# Patient Record
Sex: MALE | Race: WHITE | URBAN - METROPOLITAN AREA
[De-identification: names, ages, dates, MRNs, and addresses within clinical notes are randomized per-mention and may not be internally consistent; named-entity substitution may affect disease eponyms.]

---

## 2018-04-01 ENCOUNTER — OUTPATIENT (OUTPATIENT)
Dept: OUTPATIENT SERVICES | Facility: HOSPITAL | Age: 23
LOS: 1 days | End: 2018-04-01

## 2018-04-08 ENCOUNTER — INPATIENT (INPATIENT)
Facility: HOSPITAL | Age: 23
LOS: 4 days | Discharge: HOME | End: 2018-04-13
Attending: PSYCHIATRY & NEUROLOGY | Admitting: PSYCHIATRY & NEUROLOGY
Payer: MEDICAID

## 2018-04-08 VITALS
DIASTOLIC BLOOD PRESSURE: 102 MMHG | HEART RATE: 110 BPM | SYSTOLIC BLOOD PRESSURE: 154 MMHG | RESPIRATION RATE: 16 BRPM | TEMPERATURE: 99 F | OXYGEN SATURATION: 99 %

## 2018-04-08 DIAGNOSIS — R69 ILLNESS, UNSPECIFIED: ICD-10-CM

## 2018-04-08 DIAGNOSIS — F29 UNSPECIFIED PSYCHOSIS NOT DUE TO A SUBSTANCE OR KNOWN PHYSIOLOGICAL CONDITION: ICD-10-CM

## 2018-04-08 LAB
ALBUMIN SERPL ELPH-MCNC: 4.7 G/DL — SIGNIFICANT CHANGE UP (ref 3.3–5)
ALP SERPL-CCNC: 87 U/L — SIGNIFICANT CHANGE UP (ref 40–120)
ALT FLD-CCNC: 17 U/L — SIGNIFICANT CHANGE UP (ref 4–41)
AMPHET UR-MCNC: NEGATIVE — SIGNIFICANT CHANGE UP
APAP SERPL-MCNC: < 15 UG/ML — LOW (ref 15–25)
APPEARANCE UR: CLEAR — SIGNIFICANT CHANGE UP
AST SERPL-CCNC: 16 U/L — SIGNIFICANT CHANGE UP (ref 4–40)
BARBITURATES UR SCN-MCNC: NEGATIVE — SIGNIFICANT CHANGE UP
BASE EXCESS BLDV CALC-SCNC: 4.2 MMOL/L — SIGNIFICANT CHANGE UP
BASOPHILS # BLD AUTO: 0.04 K/UL — SIGNIFICANT CHANGE UP (ref 0–0.2)
BASOPHILS NFR BLD AUTO: 0.4 % — SIGNIFICANT CHANGE UP (ref 0–2)
BENZODIAZ UR-MCNC: NEGATIVE — SIGNIFICANT CHANGE UP
BILIRUB SERPL-MCNC: 0.4 MG/DL — SIGNIFICANT CHANGE UP (ref 0.2–1.2)
BILIRUB UR-MCNC: NEGATIVE — SIGNIFICANT CHANGE UP
BLOOD GAS VENOUS - CREATININE: 0.72 MG/DL — SIGNIFICANT CHANGE UP (ref 0.5–1.3)
BLOOD UR QL VISUAL: NEGATIVE — SIGNIFICANT CHANGE UP
BUN SERPL-MCNC: 12 MG/DL — SIGNIFICANT CHANGE UP (ref 7–23)
CALCIUM SERPL-MCNC: 9.5 MG/DL — SIGNIFICANT CHANGE UP (ref 8.4–10.5)
CANNABINOIDS UR-MCNC: NEGATIVE — SIGNIFICANT CHANGE UP
CHLORIDE BLDV-SCNC: 105 MMOL/L — SIGNIFICANT CHANGE UP (ref 96–108)
CHLORIDE SERPL-SCNC: 100 MMOL/L — SIGNIFICANT CHANGE UP (ref 98–107)
CO2 SERPL-SCNC: 26 MMOL/L — SIGNIFICANT CHANGE UP (ref 22–31)
COCAINE METAB.OTHER UR-MCNC: NEGATIVE — SIGNIFICANT CHANGE UP
COLOR SPEC: SIGNIFICANT CHANGE UP
CREAT SERPL-MCNC: 0.78 MG/DL — SIGNIFICANT CHANGE UP (ref 0.5–1.3)
EOSINOPHIL # BLD AUTO: 0.02 K/UL — SIGNIFICANT CHANGE UP (ref 0–0.5)
EOSINOPHIL NFR BLD AUTO: 0.2 % — SIGNIFICANT CHANGE UP (ref 0–6)
ETHANOL BLD-MCNC: < 10 MG/DL — SIGNIFICANT CHANGE UP
GAS PNL BLDV: 138 MMOL/L — SIGNIFICANT CHANGE UP (ref 136–146)
GLUCOSE BLDV-MCNC: 132 — HIGH (ref 70–99)
GLUCOSE SERPL-MCNC: 138 MG/DL — HIGH (ref 70–99)
GLUCOSE UR-MCNC: NEGATIVE — SIGNIFICANT CHANGE UP
HCO3 BLDV-SCNC: 28 MMOL/L — HIGH (ref 20–27)
HCT VFR BLD CALC: 42.2 % — SIGNIFICANT CHANGE UP (ref 39–50)
HCT VFR BLDV CALC: 43.4 % — SIGNIFICANT CHANGE UP (ref 39–51)
HGB BLD-MCNC: 13.9 G/DL — SIGNIFICANT CHANGE UP (ref 13–17)
HGB BLDV-MCNC: 14.2 G/DL — SIGNIFICANT CHANGE UP (ref 13–17)
IMM GRANULOCYTES # BLD AUTO: 0.03 # — SIGNIFICANT CHANGE UP
IMM GRANULOCYTES NFR BLD AUTO: 0.3 % — SIGNIFICANT CHANGE UP (ref 0–1.5)
KETONES UR-MCNC: NEGATIVE — SIGNIFICANT CHANGE UP
LACTATE BLDV-MCNC: 1.3 MMOL/L — SIGNIFICANT CHANGE UP (ref 0.5–2)
LEUKOCYTE ESTERASE UR-ACNC: NEGATIVE — SIGNIFICANT CHANGE UP
LYMPHOCYTES # BLD AUTO: 1.71 K/UL — SIGNIFICANT CHANGE UP (ref 1–3.3)
LYMPHOCYTES # BLD AUTO: 15.2 % — SIGNIFICANT CHANGE UP (ref 13–44)
MCHC RBC-ENTMCNC: 29.8 PG — SIGNIFICANT CHANGE UP (ref 27–34)
MCHC RBC-ENTMCNC: 32.9 % — SIGNIFICANT CHANGE UP (ref 32–36)
MCV RBC AUTO: 90.6 FL — SIGNIFICANT CHANGE UP (ref 80–100)
METHADONE UR-MCNC: NEGATIVE — SIGNIFICANT CHANGE UP
MONOCYTES # BLD AUTO: 0.79 K/UL — SIGNIFICANT CHANGE UP (ref 0–0.9)
MONOCYTES NFR BLD AUTO: 7 % — SIGNIFICANT CHANGE UP (ref 2–14)
MUCOUS THREADS # UR AUTO: SIGNIFICANT CHANGE UP
NEUTROPHILS # BLD AUTO: 8.68 K/UL — HIGH (ref 1.8–7.4)
NEUTROPHILS NFR BLD AUTO: 76.9 % — SIGNIFICANT CHANGE UP (ref 43–77)
NITRITE UR-MCNC: NEGATIVE — SIGNIFICANT CHANGE UP
NRBC # FLD: 0 — SIGNIFICANT CHANGE UP
OPIATES UR-MCNC: NEGATIVE — SIGNIFICANT CHANGE UP
OXYCODONE UR-MCNC: NEGATIVE — SIGNIFICANT CHANGE UP
PCO2 BLDV: 46 MMHG — SIGNIFICANT CHANGE UP (ref 41–51)
PCP UR-MCNC: NEGATIVE — SIGNIFICANT CHANGE UP
PH BLDV: 7.41 PH — SIGNIFICANT CHANGE UP (ref 7.32–7.43)
PH UR: 7.5 — SIGNIFICANT CHANGE UP (ref 4.6–8)
PLATELET # BLD AUTO: 304 K/UL — SIGNIFICANT CHANGE UP (ref 150–400)
PMV BLD: 10.4 FL — SIGNIFICANT CHANGE UP (ref 7–13)
PO2 BLDV: 59 MMHG — HIGH (ref 35–40)
POTASSIUM BLDV-SCNC: 3.5 MMOL/L — SIGNIFICANT CHANGE UP (ref 3.4–4.5)
POTASSIUM SERPL-MCNC: 3.7 MMOL/L — SIGNIFICANT CHANGE UP (ref 3.5–5.3)
POTASSIUM SERPL-SCNC: 3.7 MMOL/L — SIGNIFICANT CHANGE UP (ref 3.5–5.3)
PROT SERPL-MCNC: 7.5 G/DL — SIGNIFICANT CHANGE UP (ref 6–8.3)
PROT UR-MCNC: NEGATIVE MG/DL — SIGNIFICANT CHANGE UP
RBC # BLD: 4.66 M/UL — SIGNIFICANT CHANGE UP (ref 4.2–5.8)
RBC # FLD: 11.5 % — SIGNIFICANT CHANGE UP (ref 10.3–14.5)
RBC CASTS # UR COMP ASSIST: SIGNIFICANT CHANGE UP (ref 0–?)
SALICYLATES SERPL-MCNC: < 5 MG/DL — LOW (ref 15–30)
SAO2 % BLDV: 89.8 % — HIGH (ref 60–85)
SODIUM SERPL-SCNC: 143 MMOL/L — SIGNIFICANT CHANGE UP (ref 135–145)
SP GR SPEC: 1.01 — SIGNIFICANT CHANGE UP (ref 1–1.04)
TSH SERPL-MCNC: 1.27 UIU/ML — SIGNIFICANT CHANGE UP (ref 0.27–4.2)
UROBILINOGEN FLD QL: NORMAL MG/DL — SIGNIFICANT CHANGE UP
WBC # BLD: 11.27 K/UL — HIGH (ref 3.8–10.5)
WBC # FLD AUTO: 11.27 K/UL — HIGH (ref 3.8–10.5)
WBC UR QL: SIGNIFICANT CHANGE UP (ref 0–?)

## 2018-04-08 PROCEDURE — 99285 EMERGENCY DEPT VISIT HI MDM: CPT

## 2018-04-08 PROCEDURE — 70450 CT HEAD/BRAIN W/O DYE: CPT | Mod: 26

## 2018-04-08 RX ORDER — HALOPERIDOL DECANOATE 100 MG/ML
5 INJECTION INTRAMUSCULAR ONCE
Refills: 0 | Status: DISCONTINUED | OUTPATIENT
Start: 2018-04-08 | End: 2018-04-09

## 2018-04-08 RX ORDER — SODIUM CHLORIDE 9 MG/ML
850 INJECTION, SOLUTION INTRAVENOUS
Refills: 0 | Status: DISCONTINUED | OUTPATIENT
Start: 2018-04-08 | End: 2018-04-08

## 2018-04-08 RX ORDER — HALOPERIDOL DECANOATE 100 MG/ML
5 INJECTION INTRAMUSCULAR EVERY 6 HOURS
Refills: 0 | Status: DISCONTINUED | OUTPATIENT
Start: 2018-04-08 | End: 2018-04-09

## 2018-04-08 RX ORDER — HALOPERIDOL DECANOATE 100 MG/ML
5 INJECTION INTRAMUSCULAR ONCE
Refills: 0 | Status: COMPLETED | OUTPATIENT
Start: 2018-04-08 | End: 2018-04-08

## 2018-04-08 RX ORDER — SODIUM CHLORIDE 9 MG/ML
1000 INJECTION, SOLUTION INTRAVENOUS ONCE
Refills: 0 | Status: COMPLETED | OUTPATIENT
Start: 2018-04-08 | End: 2018-04-08

## 2018-04-08 RX ADMIN — Medication 2 MILLIGRAM(S): at 07:36

## 2018-04-08 RX ADMIN — Medication 2 MILLIGRAM(S): at 14:04

## 2018-04-08 RX ADMIN — HALOPERIDOL DECANOATE 5 MILLIGRAM(S): 100 INJECTION INTRAMUSCULAR at 08:30

## 2018-04-08 RX ADMIN — Medication 2 MILLIGRAM(S): at 20:34

## 2018-04-08 RX ADMIN — HALOPERIDOL DECANOATE 5 MILLIGRAM(S): 100 INJECTION INTRAMUSCULAR at 14:04

## 2018-04-08 RX ADMIN — Medication 2 MILLIGRAM(S): at 08:30

## 2018-04-08 RX ADMIN — SODIUM CHLORIDE 1000 MILLILITER(S): 9 INJECTION, SOLUTION INTRAVENOUS at 03:15

## 2018-04-08 NOTE — ED BEHAVIORAL HEALTH ASSESSMENT NOTE - SUMMARY
Pt is a 23-year-old Alevism male with past history of anxiety who was BIBEMS on account of bizarre behavior for 2 days. In ER, Pt was observed to be pacing and mumbling to himself saying "God is the [employer] of the earth. Is this garbage?....." Pt was given Ativan 2mg IM for agitation followed by Ativan 2mg and  Haldol 5mg IM. Pt's wife (Shahana: [phone]) was called and voicemail was left, awaiting a call back.    Wife called back and told me the following. A lot have been happening in Pt's life recently. He and his wife had a baby 4 months ago. His grandfather passed away unexpectedly due to heart attack. His parents  and his mother is now engaged. And his friend was killed in a car accident. Pt and his wife flew back from [place] last week and since then Pt has not been himself, not remembering things and being paranoid. Pt has not been sleeping and easily gets agitated. Per wife, Pt has history of ADHD and has been taking Zoloft 50mg. His therapist is Dr. Call ([phone].) Pt has no substance use history or past medical history. Pt also has no history of suicidal ideation or attempt.    After receiving the first 2mg of Ativan, Pt was still visibly agitated and tried to elope twice, so he was further medicated with Haldol 5mg and Ativan 2mg IM. Wife said he has never been like this before.

## 2018-04-08 NOTE — ED PROVIDER NOTE - MEDICAL DECISION MAKING DETAILS
agitation/confusion concern for stressful event or OD.  EKG with terminal R wave and long qrs.  concern for tca OD. tox fellow paged.

## 2018-04-08 NOTE — ED ADULT TRIAGE NOTE - CHIEF COMPLAINT QUOTE
Pt c/o anxietym & mild depression/agitation due to stress over the weekend - friends passed away. States hx of anxiety - on zoloft. Denies any physical complaints at this time. Denies SI, HI, ETOH/drug abuse. To be seen in main ER per MD. Pt c/o anxietym & mild depression/agitation due to stress over the weekend - friends passed away. States hx of anxiety - on zoloft. Denies any physical complaints at this time. Denies SI, HI, ETOH/drug abuse. To be seen in main ER per MD. Wife states pt has hx of ADHD.   Addendum: Pt became restless, began to strip in amb bay, attempted to run outside - mumbling/crying; brought to  for holding by security/EDT/PES until medical eval could be done in main ED. Pt c/o anxiety & mild depression/agitation due to stress over the weekend - friends passed away. States hx of anxiety - on zoloft. Denies any physical complaints at this time. Denies SI, HI, ETOH/drug abuse. To be seen in main ER per MD. Wife states pt has hx of ADHD.   Addendum: Pt became restless, began to strip in amb bay, attempted to run outside - mumbling/crying; brought to  for holding by security/EDT/PES until medical eval could be done in main ED.

## 2018-04-08 NOTE — ED PROVIDER NOTE - OBJECTIVE STATEMENT
23 year old male pmh anxiety presents with bizarre behavior for 2 days.  patient not answering questions.  family member at bedside states that he has been agitated, forgetfull and confused for 2 days.  unclear if patient took any pills or other substances

## 2018-04-08 NOTE — ED ADULT NURSE NOTE - CHIEF COMPLAINT QUOTE
Pt c/o anxiety & mild depression/agitation due to stress over the weekend - friends passed away. States hx of anxiety - on zoloft. Denies any physical complaints at this time. Denies SI, HI, ETOH/drug abuse. To be seen in main ER per MD. Wife states pt has hx of ADHD.   Addendum: Pt became restless, began to strip in amb bay, attempted to run outside - mumbling/crying; brought to  for holding by security/EDT/PES until medical eval could be done in main ED.

## 2018-04-08 NOTE — ED BEHAVIORAL HEALTH ASSESSMENT NOTE - RISK ASSESSMENT
Pt is acutely psychotic and poses a danger to self and others, hence requiring acute inpatient psychiatric admission.

## 2018-04-08 NOTE — ED BEHAVIORAL HEALTH ASSESSMENT NOTE - HPI (INCLUDE ILLNESS QUALITY, SEVERITY, DURATION, TIMING, CONTEXT, MODIFYING FACTORS, ASSOCIATED SIGNS AND SYMPTOMS)
Pt is a 23-year-old Yazidi male with past history of anxiety who was BIBEMS on account of bizarre behavior for 2 days. In ER, Pt was observed to be pacing and mumbling to himself saying "God is the [employer] of the earth. Is this garbage?....." Pt was given Ativan 2mg PO for agitation. Pt's wife (Shahana: [phone]) was called and voicemail was left, awaiting a call back. Pt is a 23-year-old Confucianist male with past history of anxiety who was BIBEMS on account of bizarre behavior for 2 days. In ER, Pt was observed to be pacing and mumbling to himself saying "God is the [employer] of the earth. Is this garbage?....." Pt was given Ativan 2mg IM for agitation. Pt's wife (Shahana: [phone]) was called and voicemail was left, awaiting a call back. Pt is a 23-year-old Synagogue male with past history of anxiety who was BIBEMS on account of bizarre behavior for 2 days. In ER, Pt was observed to be pacing and mumbling to himself saying "God is the [employer] of the earth. Is this garbage?....." Pt was given Ativan 2mg IM for agitation followed by Ativan 2mg and  Haldol 5mg IM. Pt's wife (Shahana: [phone]) was called and voicemail was left, awaiting a call back.    Wife called back and told me the following. A lot have been happening in Pt's life recently. He and his wife had a baby 4 months ago. His grandfather passed away unexpectedly due to heart attack. His parents  and his mother is now engaged. And his friend was killed in a car accident. Pt and his wife flew back from [place] last week and since then Pt has not been himself, not remembering things and being paranoid. Pt has not been sleeping and easily gets agitated. Per wife, Pt has history of ADHD and has been taking Zoloft 50mg. His therapist is Dr. Call ([phone].) Pt has no substance use history or past medical history. Pt also has no history of suicidal ideation or attempt.    After receiving the first 2mg of Ativan, Pt was still visibly agitated and tried to elope twice. Pt is a 23-year-old Restorationist male with past history of anxiety who was BIBEMS on account of bizarre behavior for 2 days. In ER, Pt was observed to be pacing and mumbling to himself saying "God is the [employer] of the earth. Is this garbage?....." Pt was given Ativan 2mg IM for agitation followed by Ativan 2mg and  Haldol 5mg IM. Pt's wife (Shahana: [phone]) was called and voicemail was left, awaiting a call back.    Wife called back and told me the following. A lot have been happening in Pt's life recently. He and his wife had a baby 4 months ago. His grandfather passed away unexpectedly due to heart attack. His parents  and his mother is now engaged. And his friend was killed in a car accident. Pt and his wife flew back from [place] last week and since then Pt has not been himself, not remembering things and being paranoid. Pt has not been sleeping and easily gets agitated. Per wife, Pt has history of ADHD and has been taking Zoloft 50mg. His therapist is Dr. Call ([phone].) Pt has no substance use history or past medical history. Pt also has no history of suicidal ideation or attempt.    After receiving the first 2mg of Ativan, Pt was still visibly agitated and tried to elope twice, so he was further medicated with Haldol 5mg and Ativan 2mg IM. Wife said he has never been like this before.

## 2018-04-08 NOTE — ED ADULT NURSE REASSESSMENT NOTE - NS ED NURSE REASSESS COMMENT FT1
Pt is hyperverbal about god and school.  PT pacing and mumbling to himself saying "God is the [employer] of the earth. Is this garbage?....." Pt was given Ativan 2mg IM for agitation. Pt received to  area for evaluation from psych.  Pt is hyperverbal about god and school.  Pt pacing, entering other pts rooms, and mumbling to himself saying "God is the [employer] of the earth. Is this garbage?....." Pt was given Ativan 2mg IM for agitation.

## 2018-04-08 NOTE — ED BEHAVIORAL HEALTH ASSESSMENT NOTE - DESCRIPTION
Pt was observed to be pacing in the ER a Pt was observed to be pacing in the ER and agitated, hence given Ativan 2mg IM STAT. , domiciled Pt was observed to be pacing in the ER and agitated, hence given Ativan 2mg IM STAT. After receiving 2mg Ativan, he was still agitated and tried to elope, so he was further medicated with Ativan 2mg and Haldol 5mg IM. n/a

## 2018-04-08 NOTE — ED ADULT NURSE NOTE - OBJECTIVE STATEMENT
Magaly RN: Patient received in room#3 c/o anxiety and weird behaviour. Patient wife reports they were recently away on vacation and patient has been very forgetful the past week. Wife states patient has not been eating or sleeping lately. Patient denies any SI/HI. As per wife, patient is on zoloft and quit smoking recently. Patient denies any other drugs or alcohol. Patient has a flat affect, occasionally eyes rolling back. VS as noted. 18G IV noted to left ac, labs drawn and sent. Sinus tach on CM. Report given to covering RN Paul. Will monitor.

## 2018-04-08 NOTE — ED ADULT NURSE REASSESSMENT NOTE - NS ED NURSE REASSESS COMMENT FT1
Pt received to  holding area.  Pt diaphoretic and unable to follow commands.  Pt tachy to 130's.  18G L ac. VS as noted. Pt received to  holding area.  Pt diaphoretic and unable to follow commands.  Pt tachy to 130's.  18G L ac. VS as noted.  Rpt to [facility] RN

## 2018-04-08 NOTE — ED PROVIDER NOTE - PROGRESS NOTE DETAILS
spoke with tox fellow, recommended repeat ekg after bicarb drip started to eval for narrowing of qrs.  patient eye rolling back in head appears voluntatry as behavior stopped when I touched the patient's leg. will tx as tca od as unclear whether patient had acces to meds or not recontacted Tox fellow who reviewed ekg.  because r/s ratio in avr is not more than 0.7 and patient presentation not consistent with cholinergic toxidrome associated with TCA OD, he is recommending not starting bicard drip and just providing supportive care with bzd and fluids with repeat ekg in 1 hour patient more awake and cooperative now.  denies ingestions.  flat affect not given much more hx.  awaiting psych and repeat ekg recontacted Tox fellow who reviewed ekg.  because r/s ratio in avr is not more than 0.7 and patient presentation not consistent with anticholinergic toxidrome associated with TCA OD, he is recommending not starting bicard drip and just providing supportive care with bzd and fluids with repeat ekg in 1 hour repeat ekg with qrs of 100 and no longer abnormal AVR.  medically cleared for BH and psych eval

## 2018-04-08 NOTE — ED ADULT NURSE REASSESSMENT NOTE - NS ED NURSE REASSESS COMMENT FT1
pt resting comfortably in stretcher, nad noted, resps even and unlabored, denies any medical or psychiatric complaints at this time. vss, will ctm closely, awaiting bed assignment at [facility]

## 2018-04-09 DIAGNOSIS — F29 UNSPECIFIED PSYCHOSIS NOT DUE TO A SUBSTANCE OR KNOWN PHYSIOLOGICAL CONDITION: ICD-10-CM

## 2018-04-09 RX ORDER — HALOPERIDOL DECANOATE 100 MG/ML
5 INJECTION INTRAMUSCULAR EVERY 6 HOURS
Refills: 0 | Status: DISCONTINUED | OUTPATIENT
Start: 2018-04-09 | End: 2018-04-13

## 2018-04-09 RX ORDER — DIPHENHYDRAMINE HCL 50 MG
50 CAPSULE ORAL ONCE
Refills: 0 | Status: DISCONTINUED | OUTPATIENT
Start: 2018-04-09 | End: 2018-04-13

## 2018-04-09 RX ORDER — LANOLIN ALCOHOL/MO/W.PET/CERES
6 CREAM (GRAM) TOPICAL AT BEDTIME
Refills: 0 | Status: DISCONTINUED | OUTPATIENT
Start: 2018-04-09 | End: 2018-04-13

## 2018-04-09 RX ORDER — HALOPERIDOL DECANOATE 100 MG/ML
5 INJECTION INTRAMUSCULAR ONCE
Refills: 0 | Status: DISCONTINUED | OUTPATIENT
Start: 2018-04-09 | End: 2018-04-13

## 2018-04-09 RX ORDER — DIPHENHYDRAMINE HCL 50 MG
50 CAPSULE ORAL EVERY 6 HOURS
Refills: 0 | Status: DISCONTINUED | OUTPATIENT
Start: 2018-04-09 | End: 2018-04-13

## 2018-04-09 RX ORDER — LANOLIN ALCOHOL/MO/W.PET/CERES
65 CREAM (GRAM) TOPICAL AT BEDTIME
Refills: 0 | Status: DISCONTINUED | OUTPATIENT
Start: 2018-04-09 | End: 2018-04-09

## 2018-04-09 RX ADMIN — Medication 50 MILLIGRAM(S): at 23:50

## 2018-04-09 RX ADMIN — Medication 2 MILLIGRAM(S): at 23:50

## 2018-04-09 NOTE — ED ADULT NURSE REASSESSMENT NOTE - NS ED NURSE REASSESS COMMENT FT1
pt sleeping in stretcher, arousable to verbal stimuli, pt denies any si hi ah vh, slow to naswer questions, stating "I want to sleep," vss, resps even and unlabored. will ctm closely and maintain safety.

## 2018-04-09 NOTE — ED ADULT NURSE REASSESSMENT NOTE - NS ED NURSE REASSESS COMMENT FT1
Received report from night RN pt lying on stretcher  in nad eyes close breathing even & unlabored, eval on going.

## 2018-04-10 DIAGNOSIS — R69 ILLNESS, UNSPECIFIED: ICD-10-CM

## 2018-04-10 PROCEDURE — 99223 1ST HOSP IP/OBS HIGH 75: CPT

## 2018-04-10 RX ORDER — ARIPIPRAZOLE 15 MG/1
5 TABLET ORAL AT BEDTIME
Refills: 0 | Status: DISCONTINUED | OUTPATIENT
Start: 2018-04-10 | End: 2018-04-13

## 2018-04-10 RX ADMIN — Medication 2 MILLIGRAM(S): at 20:51

## 2018-04-10 RX ADMIN — Medication 2 MILLIGRAM(S): at 13:21

## 2018-04-10 RX ADMIN — Medication 2 MILLIGRAM(S): at 09:19

## 2018-04-10 RX ADMIN — ARIPIPRAZOLE 5 MILLIGRAM(S): 15 TABLET ORAL at 20:51

## 2018-04-10 RX ADMIN — Medication 6 MILLIGRAM(S): at 21:52

## 2018-04-11 PROCEDURE — 99232 SBSQ HOSP IP/OBS MODERATE 35: CPT

## 2018-04-11 RX ORDER — ESCITALOPRAM OXALATE 10 MG/1
5 TABLET, FILM COATED ORAL DAILY
Refills: 0 | Status: DISCONTINUED | OUTPATIENT
Start: 2018-04-11 | End: 2018-04-13

## 2018-04-11 RX ADMIN — Medication 2 MILLIGRAM(S): at 08:31

## 2018-04-11 RX ADMIN — Medication 6 MILLIGRAM(S): at 21:32

## 2018-04-11 RX ADMIN — Medication 2 MILLIGRAM(S): at 21:30

## 2018-04-11 RX ADMIN — ARIPIPRAZOLE 5 MILLIGRAM(S): 15 TABLET ORAL at 21:30

## 2018-04-11 RX ADMIN — Medication 2 MILLIGRAM(S): at 13:34

## 2018-04-12 LAB
CHOLEST SERPL-MCNC: 124 MG/DL — SIGNIFICANT CHANGE UP (ref 120–199)
ERYTHROCYTE [SEDIMENTATION RATE] IN BLOOD: 11 MM/HR — SIGNIFICANT CHANGE UP (ref 1–15)
FERRITIN SERPL-MCNC: 263 NG/ML — SIGNIFICANT CHANGE UP (ref 30–400)
FOLATE SERPL-MCNC: 19.8 NG/ML — SIGNIFICANT CHANGE UP (ref 4.7–20)
HBA1C BLD-MCNC: 5 % — SIGNIFICANT CHANGE UP (ref 4–5.6)
HDLC SERPL-MCNC: 37 MG/DL — SIGNIFICANT CHANGE UP (ref 35–55)
LIPID PNL WITH DIRECT LDL SERPL: 72 MG/DL — SIGNIFICANT CHANGE UP
T3 SERPL-MCNC: 100.6 NG/DL — SIGNIFICANT CHANGE UP (ref 80–200)
T4 AB SER-ACNC: 6.21 UG/DL — SIGNIFICANT CHANGE UP (ref 5.1–13)
TRIGL SERPL-MCNC: 99 MG/DL — SIGNIFICANT CHANGE UP (ref 10–149)
VIT B12 SERPL-MCNC: 428 PG/ML — SIGNIFICANT CHANGE UP (ref 200–900)
VIT D25+D1,25 OH+D1,25 PNL SERPL-MCNC: 48 PG/ML — SIGNIFICANT CHANGE UP (ref 19.9–79.3)

## 2018-04-12 PROCEDURE — 90853 GROUP PSYCHOTHERAPY: CPT

## 2018-04-12 PROCEDURE — 90832 PSYTX W PT 30 MINUTES: CPT | Mod: 59

## 2018-04-12 PROCEDURE — 99232 SBSQ HOSP IP/OBS MODERATE 35: CPT | Mod: 25

## 2018-04-12 RX ORDER — ARIPIPRAZOLE 15 MG/1
1 TABLET ORAL
Qty: 28 | Refills: 0
Start: 2018-04-12 | End: 2018-05-09

## 2018-04-12 RX ORDER — ESCITALOPRAM OXALATE 10 MG/1
1 TABLET, FILM COATED ORAL
Qty: 28 | Refills: 0
Start: 2018-04-12 | End: 2018-05-09

## 2018-04-12 RX ADMIN — ARIPIPRAZOLE 5 MILLIGRAM(S): 15 TABLET ORAL at 21:18

## 2018-04-12 RX ADMIN — Medication 2 MILLIGRAM(S): at 21:18

## 2018-04-12 RX ADMIN — Medication 2 MILLIGRAM(S): at 09:06

## 2018-04-12 RX ADMIN — ESCITALOPRAM OXALATE 5 MILLIGRAM(S): 10 TABLET, FILM COATED ORAL at 09:06

## 2018-04-13 VITALS — TEMPERATURE: 98 F

## 2018-04-13 PROCEDURE — 99238 HOSP IP/OBS DSCHRG MGMT 30/<: CPT

## 2018-04-13 RX ADMIN — ESCITALOPRAM OXALATE 5 MILLIGRAM(S): 10 TABLET, FILM COATED ORAL at 09:08

## 2018-04-13 RX ADMIN — Medication 2 MILLIGRAM(S): at 09:08

## 2018-04-14 LAB — ANA TITR SER: NEGATIVE — SIGNIFICANT CHANGE UP

## 2022-12-30 NOTE — ED BEHAVIORAL HEALTH ASSESSMENT NOTE - BODY HABITUS
Ecchymosis    WHAT YOU NEED TO KNOW:    Ecchymosis is a collection of blood under the skin. Blood leaks from blood vessels and collects in nearby tissues. This can happen anywhere just below the skin, or in a mucus membrane, such as your mouth. Ecchymosis may appear as a large red, blue, or purple area of skin. You may also have pain or swelling in the area. Signs and symptoms may move to nearby body areas. It is important for you to follow up with your healthcare provider. Some causes of ecchymosis are serious and need medical treatment.    DISCHARGE INSTRUCTIONS:    Contact your healthcare provider if:     You have new symptoms.      Your bruise suddenly gets bigger and feels hard.       The affected area does not improve within 2 weeks.      You have ecchymosis around your eye and you are having trouble seeing.      You have questions or concerns about your condition or care.    Self-care:     Rest the area to help the tissues heal.       Apply ice to the area to relieve pain and swelling. Ice can also help prevent tissue damage. Use an ice pack, or put crushed ice in a bag. Cover the ice pack or bag with a small towel before you apply it to your skin. Apply ice for 20 minutes every hour, or as directed.       Elevate the affected area to reduce swelling, and to improve circulation. Prop the area on pillows to keep it elevated above the level of your heart. Do this as often as possible.      NSAID medicines such as ibuprofen can help reduce pain and swelling. NSAIDs are available without a prescription. Ask your healthcare provider which medicine is right for you. Ask how much to take and how often to take it. Follow directions. NSAIDs can cause stomach bleeding and kidney damage if not taken correctly. If you take blood thinner medicine, always ask if NSAIDs are safe for you.    Follow up with your healthcare provider as directed: Write down your questions so you remember to ask them during your visits. Well nourished

## 2023-10-31 NOTE — ED PROVIDER NOTE - SHIFT CHANGE DETAILS
yes
Medically clear. No acute overnight events. Psychiatric admission pending bed availability.  TAMMY

## 2024-02-27 NOTE — ED BEHAVIORAL HEALTH ASSESSMENT NOTE - NS ED BHA REVIEW OF ED CHART AVAILABLE INVESTIGATIONS REVIEWED
[de-identified] : General Dx Discussion The patient was advised of the diagnosis. The natural history of the pathology was explained in full to the patient in layman's terms. All questions were answered. The risks and benefits of surgical and non-surgical treatment alternatives were explained in full to the patient.  Case Discussed. Euflexxa tolerated well.  f/u 6 weeks.   Entered by Tara RUBIO acting as a scribe. Instructions: Dr. Ag- The documentation recorded by the scribe accurately reflects the service I personally performed and the decisions made by me.   Yes